# Patient Record
(demographics unavailable — no encounter records)

---

## 2024-12-11 NOTE — HISTORY OF PRESENT ILLNESS
[FreeTextEntry1] : pt is 88 years old cognitive impairment unable to name children is home with aide - feels well  -  good spirits takes meds  10/13/21 "doctor coming today" UA negative Ani states increased sob on walking not noted in the home needs flu vaccine needs meds renewed  1/13/22 pt fell into living room off step on 12/25 was at [facility] 12/25-12/30 sustained sub dural hematoma maxillary fractures proximal humerus fracture on left pt is in her recliner she has two home aides for a few weeks she is able to walk to bathroom left shoulder still very bruised she is alert using  meloxicam 7.5 tylenol lidoderm patches for pain meds reviewed on same as before she is confused baseline getting home PT and VNS services to f/up with dr bull re humerus fracture  2/16/22 pt has recovered largely from fall had PT now OT is reluctant to do exercises - encourages Sub dural - has healed proximal humeral fracture on left - still reduced ROM - no pain  confused discussing her mother  poor dentition and "jaw cracking" refuses dentist has soft foods but eats well  did appropriate f/ups with neuro and ortho  4/20/22 bruises have healed dementia is worsening - does not recognize her daughter last ngiht did not sleep well - had hallucinations - visual no dysuiria "breathes through her mouth" more confused no falls  6/8/22 no more falls no other MDs sleeps poorly and hallucinates when constipated or has an infection presently ok eating breakdfast no chest pain no sob no falls  9/2/22 pt fell again in june multiple pelvic fractures went to [facility] for 7 weeks home for three weeks has had dental issues - grinding teeth - will not wear guard had a pulled tooth is eating well went to neuro 8/31/22 - stopped respiridl - [id number] clonazepam .5 mg bid await confiirmation of this from family is getting home PT from [facility] - arrived as I was leaving couch is now blocking den - so pt cannot fall down staris again pt was able to walk with walker c/o left leg pain  10/27/22 no further hospitalizations she is noticeably more confused does not know who Ani is  she is somewhat impatient with visit does sleep at night gets home PT still - can walk with walker no more falls cannot get downstairs - blocked with the couch needs flu vaccine needs home covid booster aide states she has been to a neurologist - does not know who or where - not [facility] apparently ordered a new med which has not been delivered and she does not know what it is  needs med renewals last labs wnl in 6/22 1/4/23 has been well had teeht removed bc of tardive dyskinesia - takes ingrezza 40 mg qod does not take risperidone  3/16/23 pts aide called more sob on exertion - ok at rest couple of weeks birthday party for 90 next week there is concern she is k did see cardiologist 2/27/23 - all ok had covid neg test today  4/6/23 pt has been taking ingrezza for teeth grinding pt was having hallucinations then yesterday not walking, nor talking they spoke to neurologist and cut back to 1/2 daose Neuro is [id number] - Dr Kait Metzger decreased risperidal by 1/2 today is able to walk and talking at baseline  5/17/23 pt had bad nights with hallucinations and poor sleep but last night was better is getting PT and OT at home pt has leg pain at times unable to describe no falls no injuries needs refills  7/26/23 spoke to pharmacist Ed in [place] he accidnetly gave pt 50 mcg a day synthroid fromMay to July 21 she was supposed to be on 88mcg up til may then 100 mcg she is ow taking 100 mcg will have to wait 6 weeks to check Tsh pharmacist [place] pt has complaied to aide re being cold but not to me she is in bed and is conversant but confused aide states she is scratchig her groin tried monostat cream but asks re UTI Veronica requests renew PT  10/11/23 pt has been well no falls walking with walker  11/13/23 pt says "im in good shape" "I dont take any meds" sleeps fine no pain walking with a walker same meds  12/20/23 called to see pt acutely pt climbed over bed rail on 12/15 and fell to the floor [facility] found to have thoracic and lumbar compression fractures head trauma - 10 staples in head bruises neck left elbow fracture pt is  sob aides think sternum is mishapen -  abd pain have er summary no xray results available  1/17/24 pt is s/p hospitalization at [facility] multiple fractures bilateral PES covid - treated with paxil subdural hematoma - cleared for eliquis now on home hospice seen with aide and Ani she is doing a little better now she is in bed with 3 L NC denies pain tired is able to take a few steps with assistance  2/7/24 pt is at home with two aides she is able to transfer but they note rt foot is turning in and seems to be painful at hip the rt hip has been replaced in past Ani asked for hip xray to check on hardware this was cleared on speaking to hospice supervisor. she was initially off oxygen at O2 sat was 90% when put back on 2 L it went to 92% pt does not like O2 in her nose she is  in NAD speaks a little also fungal rash under breasts not responding to lotrimin  3/13/24 pts aide asking for eliquis script to send to [facility] should be covered under hospice. eating well walks some remains on hospice  5/29/24 pt is well she is up at table playing cards eats well not SOB occas oxygen - not on now takes meds not getting lasix using crestor qod  8/14/24 seen with her long term aide she is off hospice pt is doing really well she is now able to walk 15 feet with her walker aide puts on oxygen when she appears sob - but does not measure oxygen had home cxr last week shows mild chf no infiltrates she is c/o rt shoulder pain  11/14/24 pt was in the ER 5 days ago saturday reached for walker - heard a crack diagnosed with fracture rt shoulder /humerus/- displaceclavical - cracked pt is in a lot of pain with movt - ok when still  pt was on hospice and has hospital bed - this is being picked up by hospice she needs to be positioned in ways not possible with ordinary bed she needs to have head of bed elevated to decrease risk of aspiration and aide in breathing  pt is in pain - crying when moved  12/11/24 pt had a dispalced humeral fracture and fracture of humeral head Jordon Aponte came to the home and made a cast for it now she is in no pain - using prn tylenol she is in nad needs home PT

## 2024-12-11 NOTE — PHYSICAL EXAM
[Normal] : the appearance was normal, neck was supple [No Respiratory Distress] : no respiratory distress [Respiration, Rhythm And Depth] : normal respiratory rhythm and effort [Normal S1, S2] : normal S1 and S2 [Heart Rate And Rhythm] : heart rate was normal and rhythm regular [Abdomen Tenderness] : non-tender [Abdomen Soft] : soft [External Female Genitalia] : normal external genitalia [de-identified] : pt is in bed - comfortable - speaking some NAD [de-identified] : rt arm sin cast and sling - no swelling to hand [de-identified] : bruise under rt breast [de-identified] : genitals appear normal - has had a BM and covered in stool

## 2024-12-11 NOTE — REVIEW OF SYSTEMS
[Eyesight Problems] : eyesight problems [Incontinence] : incontinence [Limb Swelling] : limb swelling [Anxiety] : anxiety [Negative] : Heme/Lymph

## 2024-12-11 NOTE — ASSESSMENT
[FreeTextEntry1] : pt is at baseline has meds left ear and ankle is sore asked to turn her regularly flu vaccine given left arm will check re renewing PT  8/14/24 rt arm hurts - suggest, tylenol, heat, lidoderm patchs  nystop - fungal rash  PE cont eliquis  chf - chest xray - only chronic changes  hypothyroid - refill synthroid  is off hospice  11/14/24 pt with displaced fracture humerus and clavical fracture will contact Dr Mak re getting brace also referred to Jordon Hahn  needs hospital bed needs to be moved in ways not feasible with ordinary bed needs head of bed elevated to prevent aspiration  12/11/24 pt s/p fracture rt humerus doing much better now in cast indefinitely - brace tightened a little as swelling resolved has had some vaginal itching using nystatin and desitin renewed pantaprozole has the hospital bed

## 2024-12-19 NOTE — REVIEW OF SYSTEMS
[Fever] : no fever [Eyesight Problems] : eyesight problems [Incontinence] : incontinence [Skin Wound] : skin wound [Confused] : confusion [Limb Weakness] : limb weakness [Difficulty Walking] : difficulty walking [Change In Personality] : personality change [Easy Bruising] : a tendency for easy bruising [Negative] : Endocrine

## 2024-12-19 NOTE — PHYSICAL EXAM
[Normal] : the appearance was normal, neck was supple [No Respiratory Distress] : no respiratory distress [Respiration, Rhythm And Depth] : normal respiratory rhythm and effort [Normal S1, S2] : normal S1 and S2 [Heart Rate And Rhythm] : heart rate was normal and rhythm regular [Abdomen Tenderness] : non-tender [Abdomen Soft] : soft [External Female Genitalia] : normal external genitalia [de-identified] : pt is in lounge solange- comfortable - speaking some NAD [de-identified] : rt arm sin cast and sling - mild dstal swelling, bruises under splint, crak in skin under arm [de-identified] : genitals appear normal - has had a BM and covered in stool

## 2024-12-19 NOTE — ASSESSMENT
[FreeTextEntry1] : pt is at baseline has meds left ear and ankle is sore asked to turn her regularly flu vaccine given left arm will check re renewing PT  8/14/24 rt arm hurts - suggest, tylenol, heat, lidoderm patchs  nystop - fungal rash  PE cont eliquis  chf - chest xray - only chronic changes  hypothyroid - refill synthroid  is off hospice  11/14/24 pt with displaced fracture humerus and clavical fracture will contact Dr Mak re getting brace also referred to Jordon Hahn  needs hospital bed needs to be moved in ways not feasible with ordinary bed needs head of bed elevated to prevent aspiration  12/11/24 pt s/p fracture rt humerus doing much better now in cast indefinitely - brace tightened a little as swelling resolved has had some vaginal itching using nystatin and desitin renewed pantaprozole has the hospital bed  12/19/24 cleaned under splint called in bactroban for area of broken skin xeroform area is healing ok to take off splint and clean BP is good oxygen is good a/w tapering off respiridol

## 2024-12-19 NOTE — HISTORY OF PRESENT ILLNESS
[FreeTextEntry1] : pt is 88 years old cognitive impairment unable to name children is home with aide - feels well  -  good spirits takes meds  10/13/21 "doctor coming today" UA negative Ani states increased sob on walking not noted in the home needs flu vaccine needs meds renewed  1/13/22 pt fell into living room off step on 12/25 was at [facility] 12/25-12/30 sustained sub dural hematoma maxillary fractures proximal humerus fracture on left pt is in her recliner she has two home aides for a few weeks she is able to walk to bathroom left shoulder still very bruised she is alert using  meloxicam 7.5 tylenol lidoderm patches for pain meds reviewed on same as before she is confused baseline getting home PT and VNS services to f/up with dr bull re humerus fracture  2/16/22 pt has recovered largely from fall had PT now OT is reluctant to do exercises - encourages Sub dural - has healed proximal humeral fracture on left - still reduced ROM - no pain  confused discussing her mother  poor dentition and "jaw cracking" refuses dentist has soft foods but eats well  did appropriate f/ups with neuro and ortho  4/20/22 bruises have healed dementia is worsening - does not recognize her daughter last ngiht did not sleep well - had hallucinations - visual no dysuiria "breathes through her mouth" more confused no falls  6/8/22 no more falls no other MDs sleeps poorly and hallucinates when constipated or has an infection presently ok eating breakdfast no chest pain no sob no falls  9/2/22 pt fell again in june multiple pelvic fractures went to [facility] for 7 weeks home for three weeks has had dental issues - grinding teeth - will not wear guard had a pulled tooth is eating well went to neuro 8/31/22 - stopped respiridl - [id number] clonazepam .5 mg bid await confiirmation of this from family is getting home PT from [facility] - arrived as I was leaving couch is now blocking den - so pt cannot fall down staris again pt was able to walk with walker c/o left leg pain  10/27/22 no further hospitalizations she is noticeably more confused does not know who Ani is  she is somewhat impatient with visit does sleep at night gets home PT still - can walk with walker no more falls cannot get downstairs - blocked with the couch needs flu vaccine needs home covid booster aide states she has been to a neurologist - does not know who or where - not [facility] apparently ordered a new med which has not been delivered and she does not know what it is  needs med renewals last labs wnl in 6/22 1/4/23 has been well had teeht removed bc of tardive dyskinesia - takes ingrezza 40 mg qod does not take risperidone  3/16/23 pts aide called more sob on exertion - ok at rest couple of weeks birthday party for 90 next week there is concern she is k did see cardiologist 2/27/23 - all ok had covid neg test today  4/6/23 pt has been taking ingrezza for teeth grinding pt was having hallucinations then yesterday not walking, nor talking they spoke to neurologist and cut back to 1/2 daose Neuro is [phone] - Dr Kait Metzger decreased risperidal by 1/2 today is able to walk and talking at baseline  5/17/23 pt had bad nights with hallucinations and poor sleep but last night was better is getting PT and OT at home pt has leg pain at times unable to describe no falls no injuries needs refills  7/26/23 spoke to pharmacist Ed in [place] he accidnetly gave pt 50 mcg a day synthroid fromMay to July 21 she was supposed to be on 88mcg up til may then 100 mcg she is ow taking 100 mcg will have to wait 6 weeks to check Tsh pharmacist [place] pt has complaied to aide re being cold but not to me she is in bed and is conversant but confused aide states she is scratchig her groin tried monostat cream but asks re UTI Veronica requests renew PT  10/11/23 pt has been well no falls walking with walker  11/13/23 pt says "im in good shape" "I dont take any meds" sleeps fine no pain walking with a walker same meds  12/20/23 called to see pt acutely pt climbed over bed rail on 12/15 and fell to the floor [facility] found to have thoracic and lumbar compression fractures head trauma - 10 staples in head bruises neck left elbow fracture pt is  sob aides think sternum is mishapen -  abd pain have er summary no xray results available  1/17/24 pt is s/p hospitalization at [facility] multiple fractures bilateral PES covid - treated with paxil subdural hematoma - cleared for eliquis now on home hospice seen with aide and Ani she is doing a little better now she is in bed with 3 L NC denies pain tired is able to take a few steps with assistance  2/7/24 pt is at home with two aides she is able to transfer but they note rt foot is turning in and seems to be painful at hip the rt hip has been replaced in past Ani asked for hip xray to check on hardware this was cleared on speaking to hospice supervisor. she was initially off oxygen at O2 sat was 90% when put back on 2 L it went to 92% pt does not like O2 in her nose she is  in NAD speaks a little also fungal rash under breasts not responding to lotrimin  3/13/24 pts aide asking for eliquis script to send to [facility] should be covered under hospice. eating well walks some remains on hospice  5/29/24 pt is well she is up at table playing cards eats well not SOB occas oxygen - not on now takes meds not getting lasix using crestor qod  8/14/24 seen with her long term aide she is off hospice pt is doing really well she is now able to walk 15 feet with her walker aide puts on oxygen when she appears sob - but does not measure oxygen had home cxr last week shows mild chf no infiltrates she is c/o rt shoulder pain  11/14/24 pt was in the ER 5 days ago saturday reached for walker - heard a crack diagnosed with fracture rt shoulder /humerus/- displaceclavical - cracked pt is in a lot of pain with movt - ok when still  pt was on hospice and has hospital bed - this is being picked up by hospice she needs to be positioned in ways not possible with ordinary bed she needs to have head of bed elevated to decrease risk of aspiration and aide in breathing  pt is in pain - crying when moved  12/11/24 pt had a dispalced humeral fracture and fracture of humeral head Jordon Aponte came to the home and made a cast for it now she is in no pain - using prn tylenol she is in nad needs home PT   12/19/24 urgent visit to the house smell under the splint cast removed the splint washed the arm bruises seen break in skin under the arm - applied xeroform and a and d to cleaned arm

## 2025-03-14 NOTE — HISTORY OF PRESENT ILLNESS
[FreeTextEntry1] : pt is 88 years old cognitive impairment unable to name children is home with aide - feels well  -  good spirits takes meds  10/13/21 "doctor coming today" UA negative Ani states increased sob on walking not noted in the home needs flu vaccine needs meds renewed  1/13/22 pt fell into living room off step on 12/25 was at [place] 12/25-12/30 sustained sub dural hematoma maxillary fractures proximal humerus fracture on left pt is in her recliner she has two home aides for a few weeks she is able to walk to bathroom left shoulder still very bruised she is alert using  meloxicam 7.5 tylenol lidoderm patches for pain meds reviewed on same as before she is confused baseline getting home PT and VNS services to f/up with dr bull re humerus fracture  2/16/22 pt has recovered largely from fall had PT now OT is reluctant to do exercises - encourages Sub dural - has healed proximal humeral fracture on left - still reduced ROM - no pain  confused discussing her mother  poor dentition and "jaw cracking" refuses dentist has soft foods but eats well  did appropriate f/ups with neuro and ortho  4/20/22 bruises have healed dementia is worsening - does not recognize her daughter last ngiht did not sleep well - had hallucinations - visual no dysuiria "breathes through her mouth" more confused no falls  6/8/22 no more falls no other MDs sleeps poorly and hallucinates when constipated or has an infection presently ok eating breakdfast no chest pain no sob no falls  9/2/22 pt fell again in june multiple pelvic fractures went to [facility] for 7 weeks home for three weeks has had dental issues - grinding teeth - will not wear guard had a pulled tooth is eating well went to neuro 8/31/22 - stopped respiridl - [id number] clonazepam .5 mg bid await confiirmation of this from family is getting home PT from [facility] - arrived as I was leaving couch is now blocking den - so pt cannot fall down staris again pt was able to walk with walker c/o left leg pain  10/27/22 no further hospitalizations she is noticeably more confused does not know who Ani is  she is somewhat impatient with visit does sleep at night gets home PT still - can walk with walker no more falls cannot get downstairs - blocked with the couch needs flu vaccine needs home covid booster aide states she has been to a neurologist - does not know who or where - not [facility] apparently ordered a new med which has not been delivered and she does not know what it is  needs med renewals last labs wnl in 6/22 1/4/23 has been well had teeht removed bc of tardive dyskinesia - takes ingrezza 40 mg qod does not take risperidone  3/16/23 pts aide called more sob on exertion - ok at rest couple of weeks birthday party for 90 next week there is concern she is k did see cardiologist 2/27/23 - all ok had covid neg test today  4/6/23 pt has been taking ingrezza for teeth grinding pt was having hallucinations then yesterday not walking, nor talking they spoke to neurologist and cut back to 1/2 daose Neuro is [phone] - Dr Kait Metzger decreased risperidal by 1/2 today is able to walk and talking at baseline  5/17/23 pt had bad nights with hallucinations and poor sleep but last night was better is getting PT and OT at home pt has leg pain at times unable to describe no falls no injuries needs refills  7/26/23 spoke to pharmacist Ed in [place] he accidnetly gave pt 50 mcg a day synthroid fromMay to July 21 she was supposed to be on 88mcg up til may then 100 mcg she is ow taking 100 mcg will have to wait 6 weeks to check [facility]sh pharmacist [place] pt has complaied to aide re being cold but not to me she is in bed and is conversant but confused aide states she is scratchig her groin tried monostat cream but asks re UTI Veronica requests renew PT  10/11/23 pt has been well no falls walking with walker  11/13/23 pt says "im in good shape" "I dont take any meds" sleeps fine no pain walking with a walker same meds  12/20/23 called to see pt acutely pt climbed over bed rail on 12/15 and fell to the floor [facility] found to have thoracic and lumbar compression fractures head trauma - 10 staples in head bruises neck left elbow fracture pt is  sob aides think sternum is mishapen -  abd pain have er summary no xray results available  1/17/24 pt is s/p hospitalization at [place] multiple fractures bilateral PES covid - treated with paxil subdural hematoma - cleared for eliquis now on home hospice seen with aide and Ani she is doing a little better now she is in bed with 3 L NC denies pain tired is able to take a few steps with assistance  2/7/24 pt is at home with two aides she is able to transfer but they note rt foot is turning in and seems to be painful at hip the rt hip has been replaced in past Ani asked for hip xray to check on hardware this was cleared on speaking to hospice supervisor. she was initially off oxygen at O2 sat was 90% when put back on 2 L it went to 92% pt does not like O2 in her nose she is  in NAD speaks a little also fungal rash under breasts not responding to lotrimin  3/13/24 pts aide asking for eliquis script to send to [facility] should be covered under hospice. eating well walks some remains on hospice  5/29/24 pt is well she is up at table playing cards eats well not SOB occas oxygen - not on now takes meds not getting lasix using crestor qod  8/14/24 seen with her long term aide she is off hospice pt is doing really well she is now able to walk 15 feet with her walker aide puts on oxygen when she appears sob - but does not measure oxygen had home cxr last week shows mild chf no infiltrates she is c/o rt shoulder pain  11/14/24 pt was in the ER 5 days ago saturday reached for walker - heard a crack diagnosed with fracture rt shoulder /humerus/- displaceclavical - cracked pt is in a lot of pain with movt - ok when still  pt was on hospice and has hospital bed - this is being picked up by hospice she needs to be positioned in ways not possible with ordinary bed she needs to have head of bed elevated to decrease risk of aspiration and aide in breathing  pt is in pain - crying when moved  12/11/24 pt had a dispalced humeral fracture and fracture of humeral head Jordon Aponte came to the home and made a cast for it now she is in no pain - using prn tylenol she is in nad needs home PT   12/19/24 urgent visit to the house smell under the splint cast removed the splint washed the arm bruises seen break in skin under the arm - applied xeroform and a and d to cleaned arm  3/14/25 pt had a cough/rattling on tuesday - exporated some sputum had  home xray chest on tuesday - no results yet no fever Dr Aponte adjusted her arm brace last week had some "episodes" this week is more alert

## 2025-03-14 NOTE — REVIEW OF SYSTEMS
[Eyesight Problems] : eyesight problems [Loss Of Hearing] : hearing loss [Cough] : cough [Arthralgias] : arthralgias [Difficulty Walking] : difficulty walking [Depression] : depression [Negative] : Heme/Lymph [Fever] : no fever [Diarrhea] : no diarrhea [Skin Lesions] : no skin lesions [Skin Wound] : no skin wound [FreeTextEntry2] : no pain [FreeTextEntry3] : blind

## 2025-03-14 NOTE — HISTORY OF PRESENT ILLNESS
[FreeTextEntry1] : pt is 88 years old cognitive impairment unable to name children is home with aide - feels well  -  good spirits takes meds  10/13/21 "doctor coming today" UA negative Ani states increased sob on walking not noted in the home needs flu vaccine needs meds renewed  1/13/22 pt fell into living room off step on 12/25 was at [facility] 12/25-12/30 sustained sub dural hematoma maxillary fractures proximal humerus fracture on left pt is in her recliner she has two home aides for a few weeks she is able to walk to bathroom left shoulder still very bruised she is alert using  meloxicam 7.5 tylenol lidoderm patches for pain meds reviewed on same as before she is confused baseline getting home PT and VNS services to f/up with dr bull re humerus fracture  2/16/22 pt has recovered largely from fall had PT now OT is reluctant to do exercises - encourages Sub dural - has healed proximal humeral fracture on left - still reduced ROM - no pain  confused discussing her mother  poor dentition and "jaw cracking" refuses dentist has soft foods but eats well  did appropriate f/ups with neuro and ortho  4/20/22 bruises have healed dementia is worsening - does not recognize her daughter last ngiht did not sleep well - had hallucinations - visual no dysuiria "breathes through her mouth" more confused no falls  6/8/22 no more falls no other MDs sleeps poorly and hallucinates when constipated or has an infection presently ok eating breakdfast no chest pain no sob no falls  9/2/22 pt fell again in june multiple pelvic fractures went to [facility] for 7 weeks home for three weeks has had dental issues - grinding teeth - will not wear guard had a pulled tooth is eating well went to neuro 8/31/22 - stopped respiridl - [id number] clonazepam .5 mg bid await confiirmation of this from family is getting home PT from [employer] - arrived as I was leaving couch is now blocking den - so pt cannot fall down staris again pt was able to walk with walker c/o left leg pain  10/27/22 no further hospitalizations she is noticeably more confused does not know who Ani is  she is somewhat impatient with visit does sleep at night gets home PT still - can walk with walker no more falls cannot get downstairs - blocked with the couch needs flu vaccine needs home covid booster aide states she has been to a neurologist - does not know who or where - not [facility] apparently ordered a new med which has not been delivered and she does not know what it is  needs med renewals last labs wnl in 6/22 1/4/23 has been well had teeht removed bc of tardive dyskinesia - takes ingrezza 40 mg qod does not take risperidone  3/16/23 pts aide called more sob on exertion - ok at rest couple of weeks birthday party for 90 next week there is concern she is k did see cardiologist 2/27/23 - all ok had covid neg test today  4/6/23 pt has been taking ingrezza for teeth grinding pt was having hallucinations then yesterday not walking, nor talking they spoke to neurologist and cut back to 1/2 daose Neuro is [phone] - Dr Kait Metzger decreased risperidal by 1/2 today is able to walk and talking at baseline  5/17/23 pt had bad nights with hallucinations and poor sleep but last night was better is getting PT and OT at home pt has leg pain at times unable to describe no falls no injuries needs refills  7/26/23 spoke to pharmacist Ed in [place] he accidnetly gave pt 50 mcg a day synthroid fromMay to July 21 she was supposed to be on 88mcg up til may then 100 mcg she is ow taking 100 mcg will have to wait 6 weeks to check [facility] pharmacist [place] pt has complaied to aide re being cold but not to me she is in bed and is conversant but confused aide states she is scratchig her groin tried monostat cream but asks re UTI Veronica requests renew PT  10/11/23 pt has been well no falls walking with walker  11/13/23 pt says "im in good shape" "I dont take any meds" sleeps fine no pain walking with a walker same meds  12/20/23 called to see pt acutely pt climbed over bed rail on 12/15 and fell to the floor [facility] found to have thoracic and lumbar compression fractures head trauma - 10 staples in head bruises neck left elbow fracture pt is  sob aides think sternum is mishapen -  abd pain have er summary no xray results available  1/17/24 pt is s/p hospitalization at [facility] multiple fractures bilateral PES covid - treated with paxil subdural hematoma - cleared for eliquis now on home hospice seen with aide and Ani she is doing a little better now she is in bed with 3 L NC denies pain tired is able to take a few steps with assistance  2/7/24 pt is at home with two aides she is able to transfer but they note rt foot is turning in and seems to be painful at hip the rt hip has been replaced in past Ani asked for hip xray to check on hardware this was cleared on speaking to hospice supervisor. she was initially off oxygen at O2 sat was 90% when put back on 2 L it went to 92% pt does not like O2 in her nose she is  in NAD speaks a little also fungal rash under breasts not responding to lotrimin  3/13/24 pts aide asking for eliquis script to send to [facility] should be covered under hospice. eating well walks some remains on hospice  5/29/24 pt is well she is up at table playing cards eats well not SOB occas oxygen - not on now takes meds not getting lasix using crestor qod  8/14/24 seen with her long term aide she is off hospice pt is doing really well she is now able to walk 15 feet with her walker aide puts on oxygen when she appears sob - but does not measure oxygen had home cxr last week shows mild chf no infiltrates she is c/o rt shoulder pain  11/14/24 pt was in the ER 5 days ago saturday reached for walker - heard a crack diagnosed with fracture rt shoulder /humerus/- displaceclavical - cracked pt is in a lot of pain with movt - ok when still  pt was on hospice and has hospital bed - this is being picked up by hospice she needs to be positioned in ways not possible with ordinary bed she needs to have head of bed elevated to decrease risk of aspiration and aide in breathing  pt is in pain - crying when moved  12/11/24 pt had a dispalced humeral fracture and fracture of humeral head Jordon Aponte came to the home and made a cast for it now she is in no pain - using prn tylenol she is in nad needs home PT   12/19/24 urgent visit to the house smell under the splint cast removed the splint washed the arm bruises seen break in skin under the arm - applied xeroform and a and d to cleaned arm  3/14/25 pt had a cough/rattling on tuesday - exporated some sputum had  home xray chest on tuesday - no results yet no fever Dr Aponte adjusted her arm brace last week had some "episodes" this week is more alert

## 2025-03-14 NOTE — PHYSICAL EXAM
[No Respiratory Distress] : no respiratory distress [Respiration, Rhythm And Depth] : normal respiratory rhythm and effort [Normal S1, S2] : normal S1 and S2 [Heart Rate And Rhythm] : heart rate was normal and rhythm regular [Abdomen Tenderness] : non-tender [Abdomen Soft] : soft [External Female Genitalia] : normal external genitalia [Normal] : normal skin color and pigmentation [No Focal Deficits] : no focal deficits [de-identified] : pt is in lounge solange- comfortable - speaking some NAD [de-identified] : ar crackles [de-identified] : rt arm brace  distorted under brace - no errythema, no bruises, no swelling [de-identified] : sitting in chair , calm [de-identified] : confused

## 2025-03-14 NOTE — PHYSICAL EXAM
[No Respiratory Distress] : no respiratory distress [Respiration, Rhythm And Depth] : normal respiratory rhythm and effort [Normal S1, S2] : normal S1 and S2 [Heart Rate And Rhythm] : heart rate was normal and rhythm regular [Abdomen Tenderness] : non-tender [Abdomen Soft] : soft [External Female Genitalia] : normal external genitalia [Normal] : normal skin color and pigmentation [No Focal Deficits] : no focal deficits [de-identified] : pt is in lounge solange- comfortable - speaking some NAD [de-identified] : ar crackles [de-identified] : rt arm brace  distorted under brace - no errythema, no bruises, no swelling [de-identified] : sitting in chair , calm [de-identified] : confused

## 2025-03-14 NOTE — ASSESSMENT
[FreeTextEntry1] : pt is at baseline has meds left ear and ankle is sore asked to turn her regularly flu vaccine given left arm will check re renewing PT  8/14/24 rt arm hurts - suggest, tylenol, heat, lidoderm patchs  nystop - fungal rash  PE cont eliquis  chf - chest xray - only chronic changes  hypothyroid - refill synthroid  is off hospice  11/14/24 pt with displaced fracture humerus and clavical fracture will contact Dr Mak re getting brace also referred to Jordon Hahn  needs hospital bed needs to be moved in ways not feasible with ordinary bed needs head of bed elevated to prevent aspiration  12/11/24 pt s/p fracture rt humerus doing much better now in cast indefinitely - brace tightened a little as swelling resolved has had some vaginal itching using nystatin and desitin renewed pantaprozole has the hospital bed  12/19/24 cleaned under splint called in bactroban for area of broken skin xeroform area is healing ok to take off splint and clean BP is good oxygen is good a/w tapering off respiridol  3/14/25 on less rispirodol did not stop arm in healing -  cxr pending no fever good oxygen no sign of chf

## 2025-05-08 NOTE — ASSESSMENT
[FreeTextEntry1] : pt is at baseline has meds left ear and ankle is sore asked to turn her regularly flu vaccine given left arm will check re renewing PT  8/14/24 rt arm hurts - suggest, tylenol, heat, lidoderm patchs  nystop - fungal rash  PE cont eliquis  chf - chest xray - only chronic changes  hypothyroid - refill synthroid  is off hospice  11/14/24 pt with displaced fracture humerus and clavical fracture will contact Dr Mak re getting brace also referred to Jordon Hahn  needs hospital bed needs to be moved in ways not feasible with ordinary bed needs head of bed elevated to prevent aspiration  12/11/24 pt s/p fracture rt humerus doing much better now in cast indefinitely - brace tightened a little as swelling resolved has had some vaginal itching using nystatin and desitin renewed pantaprozole has the hospital bed  12/19/24 cleaned under splint called in bactroban for area of broken skin xeroform area is healing ok to take off splint and clean BP is good oxygen is good a/w tapering off respiridol  3/14/25 on less rispirodol did not stop arm in healing -  cxr pending no fever good oxygen no sign of chf  5/8/25 will recheck xray of arm no chf no cough will order home labs

## 2025-05-08 NOTE — HISTORY OF PRESENT ILLNESS
[FreeTextEntry1] : pt is 88 years old cognitive impairment unable to name children is home with aide - feels well  -  good spirits takes meds  10/13/21 "doctor coming today" UA negative Ani states increased sob on walking not noted in the home needs flu vaccine needs meds renewed  1/13/22 pt fell into living room off step on 12/25 was at [facility] 12/25-12/30 sustained sub dural hematoma maxillary fractures proximal humerus fracture on left pt is in her recliner she has two home aides for a few weeks she is able to walk to bathroom left shoulder still very bruised she is alert using  meloxicam 7.5 tylenol lidoderm patches for pain meds reviewed on same as before she is confused baseline getting home PT and VNS services to f/up with dr bull re humerus fracture  2/16/22 pt has recovered largely from fall had PT now OT is reluctant to do exercises - encourages Sub dural - has healed proximal humeral fracture on left - still reduced ROM - no pain  confused discussing her mother  poor dentition and "jaw cracking" refuses dentist has soft foods but eats well  did appropriate f/ups with neuro and ortho  4/20/22 bruises have healed dementia is worsening - does not recognize her daughter last ngiht did not sleep well - had hallucinations - visual no dysuiria "breathes through her mouth" more confused no falls  6/8/22 no more falls no other MDs sleeps poorly and hallucinates when constipated or has an infection presently ok eating breakdfast no chest pain no sob no falls  9/2/22 pt fell again in june multiple pelvic fractures went to [facility] for 7 weeks home for three weeks has had dental issues - grinding teeth - will not wear guard had a pulled tooth is eating well went to neuro 8/31/22 - stopped respiridl - [id number] clonazepam .5 mg bid await confiirmation of this from family is getting home PT from [facility] - arrived as I was leaving couch is now blocking den - so pt cannot fall down staris again pt was able to walk with walker c/o left leg pain  10/27/22 no further hospitalizations she is noticeably more confused does not know who Ani is  she is somewhat impatient with visit does sleep at night gets home PT still - can walk with walker no more falls cannot get downstairs - blocked with the couch needs flu vaccine needs home covid booster aide states she has been to a neurologist - does not know who or where - not [facility] apparently ordered a new med which has not been delivered and she does not know what it is  needs med renewals last labs wnl in 6/22 1/4/23 has been well had teeht removed bc of tardive dyskinesia - takes ingrezza 40 mg qod does not take risperidone  3/16/23 pts aide called more sob on exertion - ok at rest couple of weeks birthday party for 90 next week there is concern she is k did see cardiologist 2/27/23 - all ok had covid neg test today  4/6/23 pt has been taking ingrezza for teeth grinding pt was having hallucinations then yesterday not walking, nor talking they spoke to neurologist and cut back to 1/2 daose Neuro is [phone] - Dr Kait Metzger decreased risperidal by 1/2 today is able to walk and talking at baseline  5/17/23 pt had bad nights with hallucinations and poor sleep but last night was better is getting PT and OT at home pt has leg pain at times unable to describe no falls no injuries needs refills  7/26/23 spoke to pharmacist Ed in [place] he accidnetly gave pt 50 mcg a day synthroid fromMay to July 21 she was supposed to be on 88mcg up til may then 100 mcg she is ow taking 100 mcg will have to wait 6 weeks to check [facility] pharmacist [place] pt has complaied to aide re being cold but not to me she is in bed and is conversant but confused aide states she is scratchig her groin tried monostat cream but asks re UTI Veronica requests renew PT  10/11/23 pt has been well no falls walking with walker  11/13/23 pt says "im in good shape" "I dont take any meds" sleeps fine no pain walking with a walker same meds  12/20/23 called to see pt acutely pt climbed over bed rail on 12/15 and fell to the floor [facility] found to have thoracic and lumbar compression fractures head trauma - 10 staples in head bruises neck left elbow fracture pt is  sob aides think sternum is mishapen -  abd pain have er summary no xray results available  1/17/24 pt is s/p hospitalization at [facility] multiple fractures bilateral PES covid - treated with paxil subdural hematoma - cleared for eliquis now on home hospice seen with aide and Ani she is doing a little better now she is in bed with 3 L NC denies pain tired is able to take a few steps with assistance  2/7/24 pt is at home with two aides she is able to transfer but they note rt foot is turning in and seems to be painful at hip the rt hip has been replaced in past Ani asked for hip xray to check on hardware this was cleared on speaking to hospice supervisor. she was initially off oxygen at O2 sat was 90% when put back on 2 L it went to 92% pt does not like O2 in her nose she is  in NAD speaks a little also fungal rash under breasts not responding to lotrimin  3/13/24 pts aide asking for eliquis script to send to [facility] should be covered under hospice. eating well walks some remains on hospice  5/29/24 pt is well she is up at table playing cards eats well not SOB occas oxygen - not on now takes meds not getting lasix using crestor qod  8/14/24 seen with her long term aide she is off hospice pt is doing really well she is now able to walk 15 feet with her walker aide puts on oxygen when she appears sob - but does not measure oxygen had home cxr last week shows mild chf no infiltrates she is c/o rt shoulder pain  11/14/24 pt was in the ER 5 days ago saturday reached for walker - heard a crack diagnosed with fracture rt shoulder /humerus/- displaceclavical - cracked pt is in a lot of pain with movt - ok when still  pt was on hospice and has hospital bed - this is being picked up by hospice she needs to be positioned in ways not possible with ordinary bed she needs to have head of bed elevated to decrease risk of aspiration and aide in breathing  pt is in pain - crying when moved  12/11/24 pt had a dispalced humeral fracture and fracture of humeral head Jordon Aponte came to the home and made a cast for it now she is in no pain - using prn tylenol she is in nad needs home PT   12/19/24 urgent visit to the house smell under the splint cast removed the splint washed the arm bruises seen break in skin under the arm - applied xeroform and a and d to cleaned arm  3/14/25 pt had a cough/rattling on tuesday - exporated some sputum had  home xray chest on tuesday - no results yet no fever Dr Aponte adjusted her arm brace last week had some "episodes" this week is more alert  5/8/25 no more cough voice not same as per aide no complaints

## 2025-05-08 NOTE — PHYSICAL EXAM
[No Respiratory Distress] : no respiratory distress [Respiration, Rhythm And Depth] : normal respiratory rhythm and effort [Normal S1, S2] : normal S1 and S2 [Heart Rate And Rhythm] : heart rate was normal and rhythm regular [Abdomen Tenderness] : non-tender [Abdomen Soft] : soft [External Female Genitalia] : normal external genitalia [Normal] : normal skin color and pigmentation [No Focal Deficits] : no focal deficits [de-identified] : pt is in lounge solange- comfortable - speaking some NAD [de-identified] : ar crackles [de-identified] : rt arm brace  distorted under brace - no errythema, no bruises, no swelling [de-identified] : sitting in chair , calm [de-identified] : left calf large hematoma [de-identified] : confused

## 2025-05-08 NOTE — REVIEW OF SYSTEMS
[Feeling Tired] : feeling tired [Eyesight Problems] : eyesight problems [Loss Of Hearing] : hearing loss [Arthralgias] : arthralgias [Confused] : confusion [Limb Weakness] : limb weakness [Difficulty Walking] : difficulty walking [Negative] : Heme/Lymph [FreeTextEntry3] : blind

## 2025-07-02 NOTE — HISTORY OF PRESENT ILLNESS
[FreeTextEntry1] : pt is 88 years old cognitive impairment unable to name children is home with aide - feels well  -  good spirits takes meds  10/13/21 "doctor coming today" UA negative Ani states increased sob on walking not noted in the home needs flu vaccine needs meds renewed  1/13/22 pt fell into living room off step on 12/25 was at [facility] 12/25-12/30 sustained sub dural hematoma maxillary fractures proximal humerus fracture on left pt is in her recliner she has two home aides for a few weeks she is able to walk to bathroom left shoulder still very bruised she is alert using  meloxicam 7.5 tylenol lidoderm patches for pain meds reviewed on same as before she is confused baseline getting home PT and [facility] services to f/up with dr bull re humerus fracture  2/16/22 pt has recovered largely from fall had PT now OT is reluctant to do exercises - encourages Sub dural - has healed proximal humeral fracture on left - still reduced ROM - no pain  confused discussing her mother  poor dentition and "jaw cracking" refuses dentist has soft foods but eats well  did appropriate f/ups with neuro and ortho  4/20/22 bruises have healed dementia is worsening - does not recognize her daughter last ngiht did not sleep well - had hallucinations - visual no dysuiria "breathes through her mouth" more confused no falls  6/8/22 no more falls no other MDs sleeps poorly and hallucinates when constipated or has an infection presently ok eating breakdfast no chest pain no sob no falls  9/2/22 pt fell again in june multiple pelvic fractures went to [facility] for 7 weeks home for three weeks has had dental issues - grinding teeth - will not wear guard had a pulled tooth is eating well went to neuro 8/31/22 - stopped respiridl - [id number] clonazepam .5 mg bid await confiirmation of this from family is getting home PT from [facility] - arrived as I was leaving couch is now blocking den - so pt cannot fall down staris again pt was able to walk with walker c/o left leg pain  10/27/22 no further hospitalizations she is noticeably more confused does not know who Ani is  she is somewhat impatient with visit does sleep at night gets home PT still - can walk with walker no more falls cannot get downstairs - blocked with the couch needs flu vaccine needs home covid booster aide states she has been to a neurologist - does not know who or where - not [facility] apparently ordered a new med which has not been delivered and she does not know what it is  needs med renewals last labs wnl in 6/22 1/4/23 has been well had teeht removed bc of tardive dyskinesia - takes ingrezza 40 mg qod does not take risperidone  3/16/23 pts aide called more sob on exertion - ok at rest couple of weeks birthday party for 90 next week there is concern she is k did see cardiologist 2/27/23 - all ok had covid neg test today  4/6/23 pt has been taking ingrezza for teeth grinding pt was having hallucinations then yesterday not walking, nor talking they spoke to neurologist and cut back to 1/2 daose Neuro is [phone] - Dr Kait Metzger decreased risperidal by 1/2 today is able to walk and talking at baseline  5/17/23 pt had bad nights with hallucinations and poor sleep but last night was better is getting PT and OT at home pt has leg pain at times unable to describe no falls no injuries needs refills  7/26/23 spoke to pharmacist Ed in [place] he accidnetly gave pt 50 mcg a day synthroid fromMay to July 21 she was supposed to be on 88mcg up til may then 100 mcg she is ow taking 100 mcg will have to wait 6 weeks to check [facility] pharmacist [place] pt has complaied to aide re being cold but not to me she is in bed and is conversant but confused aide states she is scratchig her groin tried monostat cream but asks re UTI Veronica requests renew PT  10/11/23 pt has been well no falls walking with walker  11/13/23 pt says "im in good shape" "I dont take any meds" sleeps fine no pain walking with a walker same meds  12/20/23 called to see pt acutely pt climbed over bed rail on 12/15 and fell to the floor [facility] found to have thoracic and lumbar compression fractures head trauma - 10 staples in head bruises neck left elbow fracture pt is  sob aides think sternum is mishapen -  abd pain have er summary no xray results available  1/17/24 pt is s/p hospitalization at [facility] multiple fractures bilateral PES covid - treated with paxil subdural hematoma - cleared for eliquis now on home hospice seen with aide and Ani she is doing a little better now she is in bed with 3 L NC denies pain tired is able to take a few steps with assistance  2/7/24 pt is at home with two aides she is able to transfer but they note rt foot is turning in and seems to be painful at hip the rt hip has been replaced in past Ani asked for hip xray to check on hardware this was cleared on speaking to hospice supervisor. she was initially off oxygen at O2 sat was 90% when put back on 2 L it went to 92% pt does not like O2 in her nose she is  in NAD speaks a little also fungal rash under breasts not responding to lotrimin  3/13/24 pts aide asking for eliquis script to send to [facility] should be covered under hospice. eating well walks some remains on hospice  5/29/24 pt is well she is up at table playing cards eats well not SOB occas oxygen - not on now takes meds not getting lasix using crestor qod  8/14/24 seen with her long term aide she is off hospice pt is doing really well she is now able to walk 15 feet with her walker aide puts on oxygen when she appears sob - but does not measure oxygen had home cxr last week shows mild chf no infiltrates she is c/o rt shoulder pain  11/14/24 pt was in the ER 5 days ago saturday reached for walker - heard a crack diagnosed with fracture rt shoulder /humerus/- displaceclavical - cracked pt is in a lot of pain with movt - ok when still  pt was on hospice and has hospital bed - this is being picked up by hospice she needs to be positioned in ways not possible with ordinary bed she needs to have head of bed elevated to decrease risk of aspiration and aide in breathing  pt is in pain - crying when moved  12/11/24 pt had a dispalced humeral fracture and fracture of humeral head Jordon Aponte came to the home and made a cast for it now she is in no pain - using prn tylenol she is in nad needs home PT   12/19/24 urgent visit to the house smell under the splint cast removed the splint washed the arm bruises seen break in skin under the arm - applied xeroform and a and d to cleaned arm  3/14/25 pt had a cough/rattling on tuesday - exporated some sputum had  home xray chest on tuesday - no results yet no fever Dr Aponte adjusted her arm brace last week had some "episodes" this week is more alert  5/8/25 no more cough voice not same as per aide no complaints  7/2/25 7 months since fracture of rt shoulder still wears brace  aide requests f/up xray she is able to walk a little with walker she is quiet - says a few words no pain no coughing

## 2025-07-02 NOTE — ASSESSMENT
[FreeTextEntry1] : pt is at baseline has meds left ear and ankle is sore asked to turn her regularly flu vaccine given left arm will check re renewing PT  8/14/24 rt arm hurts - suggest, tylenol, heat, lidoderm patchs  nystop - fungal rash  PE cont eliquis  chf - chest xray - only chronic changes  hypothyroid - refill synthroid  is off hospice  11/14/24 pt with displaced fracture humerus and clavical fracture will contact Dr Mak re getting brace also referred to Jordon Hahn  needs hospital bed needs to be moved in ways not feasible with ordinary bed needs head of bed elevated to prevent aspiration  12/11/24 pt s/p fracture rt humerus doing much better now in cast indefinitely - brace tightened a little as swelling resolved has had some vaginal itching using nystatin and desitin renewed pantaprozole has the hospital bed  12/19/24 cleaned under splint called in bactroban for area of broken skin xeroform area is healing ok to take off splint and clean BP is good oxygen is good a/w tapering off respiridol  3/14/25 on less rispirodol did not stop arm in healing -  cxr pending no fever good oxygen no sign of chf  5/8/25 will recheck xray of arm no chf no cough will order home labs  7/2/25 will order another xray rt shoulder - swelling is better  renew eliquis and rosuvastatin and ingrezza  no chf bp low off meds  reviewed labs 5/12/25 all ok

## 2025-07-02 NOTE — PHYSICAL EXAM
[No Respiratory Distress] : no respiratory distress [Respiration, Rhythm And Depth] : normal respiratory rhythm and effort [Normal S1, S2] : normal S1 and S2 [Heart Rate And Rhythm] : heart rate was normal and rhythm regular [Abdomen Tenderness] : non-tender [Abdomen Soft] : soft [Normal] : normal skin color and pigmentation [No Focal Deficits] : no focal deficits [de-identified] : pt is in lounge solange- comfortable - quiet - NAD [de-identified] : rt arm brace  can feel lump upper arm - no errythema, no bruises, no swelling [de-identified] : sitting in chair , calm [de-identified] : left ankle hematoma [de-identified] : confused [de-identified] : dozing

## 2025-07-02 NOTE — REVIEW OF SYSTEMS
[Feeling Tired] : feeling tired [Eyesight Problems] : eyesight problems [Shortness Of Breath] : no shortness of breath [Cough] : no cough [Incontinence] : incontinence [Joint Stiffness] : joint stiffness [Confused] : confusion [Limb Weakness] : limb weakness [Difficulty Walking] : difficulty walking [Change In Personality] : personality change [Muscle Weakness] : muscle weakness [Easy Bruising] : a tendency for easy bruising [Negative] : Gastrointestinal [de-identified] : bruises on legs